# Patient Record
Sex: FEMALE | Race: WHITE | Employment: FULL TIME | URBAN - METROPOLITAN AREA
[De-identification: names, ages, dates, MRNs, and addresses within clinical notes are randomized per-mention and may not be internally consistent; named-entity substitution may affect disease eponyms.]

---

## 2018-02-12 ENCOUNTER — HOSPITAL ENCOUNTER (OUTPATIENT)
Dept: ULTRASOUND IMAGING | Age: 43
Discharge: HOME | End: 2018-02-12
Attending: FAMILY MEDICINE
Payer: COMMERCIAL

## 2018-02-12 DIAGNOSIS — R10.11 RUQ PAIN: ICD-10-CM

## 2018-02-12 PROCEDURE — 76705 ECHO EXAM OF ABDOMEN: CPT

## 2018-02-26 ENCOUNTER — HOSPITAL ENCOUNTER (OUTPATIENT)
Dept: NUCLEAR MEDICINE | Age: 43
Discharge: HOME | End: 2018-02-26
Attending: SPECIALIST
Payer: COMMERCIAL

## 2018-02-26 VITALS — WEIGHT: 155 LBS

## 2018-02-26 DIAGNOSIS — R10.11 RUQ PAIN: ICD-10-CM

## 2018-02-26 DIAGNOSIS — R10.13 EPIGASTRIC PAIN: ICD-10-CM

## 2018-02-26 DIAGNOSIS — R14.0 BLOATING: ICD-10-CM

## 2018-02-26 PROCEDURE — A9537 TC99M MEBROFENIN: HCPCS

## 2018-02-26 PROCEDURE — 78227 HEPATOBIL SYST IMAGE W/DRUG: CPT

## 2018-02-26 PROCEDURE — 74011250636 HC RX REV CODE- 250/636

## 2018-02-26 RX ADMIN — SINCALIDE 1.41 MCG: 5 INJECTION, POWDER, LYOPHILIZED, FOR SOLUTION INTRAVENOUS at 14:18

## 2020-09-18 ENCOUNTER — OFFICE VISIT (OUTPATIENT)
Dept: PRIMARY CARE CLINIC | Age: 45
End: 2020-09-18
Payer: COMMERCIAL

## 2020-09-18 VITALS
HEART RATE: 79 BPM | SYSTOLIC BLOOD PRESSURE: 118 MMHG | RESPIRATION RATE: 16 BRPM | WEIGHT: 165 LBS | BODY MASS INDEX: 25.9 KG/M2 | TEMPERATURE: 98.3 F | OXYGEN SATURATION: 99 % | DIASTOLIC BLOOD PRESSURE: 77 MMHG | HEIGHT: 67 IN

## 2020-09-18 DIAGNOSIS — K30 FUNCTIONAL DYSPEPSIA: Primary | ICD-10-CM

## 2020-09-18 PROCEDURE — 99202 OFFICE O/P NEW SF 15 MIN: CPT | Performed by: NURSE PRACTITIONER

## 2020-09-18 RX ORDER — PANTOPRAZOLE SODIUM 20 MG/1
20 TABLET, DELAYED RELEASE ORAL DAILY
Qty: 21 TAB | Refills: 0 | Status: SHIPPED | OUTPATIENT
Start: 2020-09-18 | End: 2020-09-23

## 2020-09-18 NOTE — PATIENT INSTRUCTIONS
Indigestion (Dyspepsia or Heartburn): Care Instructions Your Care Instructions Sometimes it can be hard to pinpoint the cause of indigestion. (It is also called dyspepsia or heartburn.) Most cases of an upset stomach with bloating, burning, burping, and nausea are minor and go away within several hours. Home treatment and over-the-counter medicine often are able to control symptoms. But if you take medicine to relieve your indigestion without making diet and lifestyle changes, your symptoms are likely to return again and again. If you get indigestion often, it may be a sign of a more serious medical problem. Be sure to follow up with your doctor, who may want to do tests to be sure of the cause of your indigestion. Follow-up care is a key part of your treatment and safety. Be sure to make and go to all appointments, and call your doctor if you are having problems. It's also a good idea to know your test results and keep a list of the medicines you take. How can you care for yourself at home? · Your doctor may recommend over-the-counter medicine. For mild or occasional indigestion, antacids such as Gaviscon, Mylanta, Maalox, or Tums, may help. Be safe with medicines. Be careful when you take over-the-counter antacid medicines. Many of these medicines have aspirin in them. Read the label to make sure that you are not taking more than the recommended dose. Too much aspirin can be harmful. · Your doctor also may recommend over-the-counter acid reducers, such as Pepcid AC (famotidine), Tagamet HB (cimetidine), or Prilosec (omeprazole). Read and follow all instructions on the label. If you use these medicines often, talk with your doctor. · Change your eating habits. ? It's best to eat several small meals instead of two or three large meals. ? After you eat, wait 2 to 3 hours before you lie down. ? Chocolate, mint, and alcohol can make GERD worse. ? Spicy foods, foods that have a lot of acid (like tomatoes and oranges), and coffee can make GERD symptoms worse in some people. If your symptoms are worse after you eat a certain food, you may want to stop eating that food to see if your symptoms get better. · Do not smoke or chew tobacco. Smoking can make GERD worse. If you need help quitting, talk to your doctor about stop-smoking programs and medicines. These can increase your chances of quitting for good. · If you have GERD symptoms at night, raise the head of your bed 6 to 8 inches. You can do this by putting the frame on blocks or placing a foam wedge under the head of your mattress. (Adding extra pillows does not work.) · Do not wear tight clothing around your middle. · Lose weight if you need to. Losing just 5 to 10 pounds can help. · Do not take anti-inflammatory medicines, such as aspirin, ibuprofen (Advil, Motrin), or naproxen (Aleve). These can irritate the stomach. If you need a pain medicine, try acetaminophen (Tylenol), which does not cause stomach upset. When should you call for help? Call your doctor now or seek immediate medical care if: 
  · You have new or worse belly pain.  
  · You are vomiting. Watch closely for changes in your health, and be sure to contact your doctor if: 
  · You have new or worse symptoms of indigestion.  
  · You have trouble or pain swallowing.  
  · You are losing weight.  
  · You do not get better as expected. Where can you learn more? Go to http://brandy-mai.info/ Enter I602 in the search box to learn more about \"Indigestion (Dyspepsia or Heartburn): Care Instructions. \" Current as of: April 15, 2020               Content Version: 12.6 © 2977-3624 [employer]. Care instructions adapted under license by [employer] (which disclaims liability or warranty for this information).  If you have questions about a medical condition or this instruction, always ask your healthcare professional. [place] [address] any warranty or liability for your use of this information.

## 2020-09-18 NOTE — PROGRESS NOTES
RM 4    Chief Complaint   Patient presents with    Abdominal Pain     pt gets bloated and nauseated after eating off and on, x 2 weeks along with some pain       Visit Vitals  /77 (BP 1 Location: Right arm, BP Patient Position: Sitting)   Pulse 79   Temp 98.3 °F (36.8 °C) (Skin)   Resp 16   Ht 5' 6.5\" (1.689 m)   Wt 165 lb (74.8 kg)   SpO2 99%   BMI 26.23 kg/m²

## 2020-09-18 NOTE — PROGRESS NOTES
HISTORY OF PRESENT ILLNESS  Carly Vizcarra is a 39 y.o. female. Patient presents with a  2 weeks history of bloating and nausea off and on. She reports epigastric discomfort after each meal regardless of the size. Her stomach feels bloated and she feels full immediately. She reports having a clean diet, minimal caffeine, no NSAID use. She denies constipation or diarrhea, no blood or mucus in stool, no unintentional weight loss. She has a mild intolerance to lactose. No other food allergies. Did take probiotics sporatically, which seemed to help. She   has seen  Dr. Emeli Cooper  in the past. Had endoscopy 2018 with a diagnosis of gastritis. Abdominal Pain   The history is provided by the patient. This is a new problem. The current episode started more than 1 week ago. The problem occurs daily. The problem has not changed since onset. Associated symptoms include abdominal pain. The symptoms are aggravated by eating. No past medical history on file. No past surgical history on file. Not on File      Review of Systems   Constitutional: Negative for chills, malaise/fatigue and weight loss. Cardiovascular: Negative for leg swelling. Gastrointestinal: Positive for abdominal pain. Genitourinary: Negative for dysuria. Musculoskeletal: Negative for myalgias. Endo/Heme/Allergies: Negative for environmental allergies. Psychiatric/Behavioral: Negative for depression. Physical Exam  Vitals signs and nursing note reviewed. Constitutional:       General: She is not in acute distress. Appearance: Normal appearance. She is normal weight. HENT:      Head: Normocephalic and atraumatic. Eyes:      Pupils: Pupils are equal, round, and reactive to light. Cardiovascular:      Rate and Rhythm: Normal rate. Pulses: Normal pulses. Pulmonary:      Effort: Pulmonary effort is normal.   Abdominal:      General: Bowel sounds are normal. There is no distension. Palpations: Abdomen is soft. There is no hepatomegaly or splenomegaly. Tenderness: There is no abdominal tenderness. Skin:     General: Skin is warm. Neurological:      General: No focal deficit present. Mental Status: She is alert. Psychiatric:         Mood and Affect: Mood normal.         ASSESSMENT and PLAN    ICD-10-CM ICD-9-CM    1. Functional dyspepsia  K30 536.8      Encounter Diagnoses   Name Primary?  Functional dyspepsia Yes     Orders Placed This Encounter    pantoprazole (PROTONIX) 20 mg tablet   Trial PPI for 14 to 21 days. If no response after 7 days please call your GI doctor for an appointment. Monitor intake with a food diary to track symptoms. Continue taking probiotic. If possible avoid products with lactose. It was a pleasure to see you in the office today. Please call if you have any further questions or concerns. I am available through the portal system.      Signed By: BOUBACAR Cabrera     September 18, 2020

## 2020-09-23 ENCOUNTER — TELEPHONE (OUTPATIENT)
Dept: PRIMARY CARE CLINIC | Age: 45
End: 2020-09-23

## 2020-09-23 DIAGNOSIS — K29.00 ACUTE GASTRITIS WITHOUT HEMORRHAGE, UNSPECIFIED GASTRITIS TYPE: Primary | ICD-10-CM

## 2020-09-23 RX ORDER — FAMOTIDINE 40 MG/1
40 TABLET, FILM COATED ORAL DAILY
Qty: 30 TAB | Refills: 0 | Status: SHIPPED | OUTPATIENT
Start: 2020-09-23 | End: 2020-10-23

## 2020-09-23 NOTE — TELEPHONE ENCOUNTER
Pt was seen on 9/18/2020 and was prescribed Pro tonics and it worked the first few days not not working. Pt has appt with GI MD on 10/7/2020.  Pt would like a call back

## 2020-09-23 NOTE — TELEPHONE ENCOUNTER
Patient called. States medication was working for several days. Symptoms returned days later with bloating and epigastric pain immediately after eating. Also states she has been fatigued lately. Has been monitoring food intake. Raheelies N/V/D/C. Does have a virtual visit with GI on October 7th. Instructed patient to stop protonix. Will trial pepcid. Continue to monitor ans document symptoms for GI visit.

## 2020-12-17 ENCOUNTER — OFFICE VISIT (OUTPATIENT)
Dept: URGENT CARE | Age: 45
End: 2020-12-17

## 2020-12-17 VITALS — HEART RATE: 77 BPM | TEMPERATURE: 98.9 F | RESPIRATION RATE: 18 BRPM | OXYGEN SATURATION: 98 %

## 2020-12-17 DIAGNOSIS — Z20.822 EXPOSURE TO COVID-19 VIRUS: Primary | ICD-10-CM

## 2020-12-17 PROCEDURE — 99202 OFFICE O/P NEW SF 15 MIN: CPT | Performed by: FAMILY MEDICINE

## 2020-12-17 NOTE — LETTER
December 17, 2020 Samantha Acevedo [address] [facility] [address] Dear Dilcia: Thank you for requesting access to [facility]. Please follow the instructions below to securely access and download your online medical record. [facility] allows you to send messages to your doctor, view your test results, renew your prescriptions, schedule appointments, and more. How Do I Sign Up? 1. In your internet browser, go to [email or web address]. 2. Click on the First Time User? Click Here link in the Sign In box. You will see the New Member Sign Up page. 3. Enter your [facility] Access Code exactly as it appears below. You will not need to use this code after youve completed the sign-up process. If you do not sign up before the expiration date, you must request a new code. [facility] Access Code: [email or web address] Expires: 1/31/2021  9:24 AM  
 
4. Enter the last four digits of your Social Security Number (xxxx) and Date of Birth (mm/dd/yyyy) as indicated and click Submit. You will be taken to the next sign-up page. 5. Create a [facility] ID. This will be your [facility] login ID and cannot be changed, so think of one that is secure and easy to remember. 6. Create a [facility] password. You can change your password at any time. 7. Enter your Password Reset Question and Answer. This can be used at a later time if you forget your password. 8. Enter your e-mail address. You will receive e-mail notification when new information is available in [address]. 9. Click Sign Up. You can now view and download portions of your medical record. 10. Click the Download Summary menu link to download a portable copy of your medical information. Additional Information If you have questions, please visit the Frequently Asked Questions section of the [employer] website at [email or web address] Remember, [facility] is NOT to be used for urgent needs. For medical emergencies, dial 911. Now available from your iPhone and Android! Sincerely, [employer]

## 2020-12-17 NOTE — PROGRESS NOTES
This patient was seen at [address] [place] [address] [facility]gent Care while in their vehicle due to COVID-19 pandemic with PPE and focused examination in order to decrease community viral transmission. The patient/guardian gave verbal consent to treat. Dixie Freed is a 39 y.o. female who presents for COVID-19 testing. Was exposed to COVID-19 by co-worker, last contact 2 days ago. Denies cough, fever, SOB. The history is provided by the patient. History reviewed. No pertinent past medical history. History reviewed. No pertinent surgical history. History reviewed. No pertinent family history.      Social History     Socioeconomic History    Marital status:      Spouse name: Not on file    Number of children: Not on file    Years of education: Not on file    Highest education level: Not on file   Occupational History    Not on file   Social Needs    Financial resource strain: Not on file    Food insecurity     Worry: Not on file     Inability: Not on file    Transportation needs     Medical: Not on file     Non-medical: Not on file   Tobacco Use    Smoking status: Unknown If Ever Smoked   Substance and Sexual Activity    Alcohol use: Not on file    Drug use: Not on file    Sexual activity: Not on file   Lifestyle    Physical activity     Days per week: Not on file     Minutes per session: Not on file    Stress: Not on file   Relationships    Social connections     Talks on phone: Not on file     Gets together: Not on file     Attends Shinto service: Not on file     Active member of club or organization: Not on file     Attends meetings of clubs or organizations: Not on file     Relationship status: Not on file    Intimate partner violence     Fear of current or ex partner: Not on file     Emotionally abused: Not on file     Physically abused: Not on file     Forced sexual activity: Not on file   Other Topics Concern    Not on file   Social History Narrative    Not on file ALLERGIES: Patient has no known allergies. Review of Systems   Constitutional: Negative for activity change, appetite change, chills and fever. HENT: Negative for congestion, rhinorrhea and sore throat. Respiratory: Negative for cough, shortness of breath and wheezing. Cardiovascular: Negative for chest pain. Gastrointestinal: Negative for abdominal pain, diarrhea, nausea and vomiting. Musculoskeletal: Negative for myalgias. Neurological: Negative for headaches. Vitals:    12/17/20 0937   Pulse: 77   Resp: 18   Temp: 98.9 °F (37.2 °C)   SpO2: 98%       Physical Exam  Vitals signs and nursing note reviewed. Constitutional:       General: She is not in acute distress. Appearance: She is well-developed. She is not diaphoretic. Neurological:      Mental Status: She is alert. Psychiatric:         Behavior: Behavior normal.         Thought Content: Thought content normal.         Judgment: Judgment normal.         MDM    ICD-10-CM ICD-9-CM   1. Exposure to COVID-19 virus  Z20.828 V01.79       Orders Placed This Encounter    NOVEL CORONAVIRUS (COVID-19)     Scheduling Instructions:      1) Due to current limited availability of the COVID-19 PCR test, tests will be prioritized and may not be completed.              2) Order only if the test result will change clinical management or necessary for a return to mission-critical employment decision.              3) Print and instruct patient to adhere to CDC home isolation program. (Link Above)              4) Set up or refer patient for a monitoring program.              5) Have patient sign up for and leverage MyChart (if not previously done). Order Specific Question:   Is this test for diagnosis or screening? Answer:   Screening     Order Specific Question:   Symptomatic for COVID-19 as defined by CDC? Answer:   No     Order Specific Question:   Hospitalized for COVID-19?      Answer:   No     Order Specific Question:   Admitted to ICU for COVID-19? Answer:   No     Order Specific Question:   Employed in healthcare setting? Answer:   No     Order Specific Question:   Resident in a congregate (group) care setting? Answer:   No     Order Specific Question:   Pregnant? Answer:   No     Order Specific Question:   Previously tested for COVID-19? Answer:   No        Quarantine  Deep breathing exercises, ambulation      If signs and symptoms become worse the pt is to go to the ER.          Procedures

## 2020-12-19 LAB — SARS-COV-2, NAA: NOT DETECTED

## 2023-06-27 ENCOUNTER — HOSPITAL ENCOUNTER (OUTPATIENT)
Facility: HOSPITAL | Age: 48
Discharge: HOME | End: 2023-06-30
Attending: OBSTETRICS & GYNECOLOGY
Payer: COMMERCIAL

## 2023-06-27 DIAGNOSIS — N63.20 MASS OF LEFT BREAST, UNSPECIFIED QUADRANT: ICD-10-CM

## 2023-06-27 PROCEDURE — 76642 ULTRASOUND BREAST LIMITED: CPT

## 2023-06-27 PROCEDURE — 77066 DX MAMMO INCL CAD BI: CPT

## 2024-06-30 ENCOUNTER — HOSPITAL ENCOUNTER (EMERGENCY)
Facility: HOSPITAL | Age: 49
Discharge: HOME | End: 2024-06-30
Payer: OTHER MISCELLANEOUS

## 2024-06-30 ENCOUNTER — APPOINTMENT (OUTPATIENT)
Facility: HOSPITAL | Age: 49
End: 2024-06-30
Payer: OTHER MISCELLANEOUS

## 2024-06-30 VITALS
SYSTOLIC BLOOD PRESSURE: 140 MMHG | HEART RATE: 89 BPM | DIASTOLIC BLOOD PRESSURE: 83 MMHG | RESPIRATION RATE: 14 BRPM | TEMPERATURE: 98.6 F | WEIGHT: 171.52 LBS | OXYGEN SATURATION: 100 %
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
## 2024-06-30 DIAGNOSIS — S16.1XXA STRAIN OF NECK MUSCLE, INITIAL ENCOUNTER: ICD-10-CM

## 2024-06-30 DIAGNOSIS — M50.30 DDD (DEGENERATIVE DISC DISEASE), CERVICAL: ICD-10-CM

## 2024-06-30 PROCEDURE — 6370000000 HC RX 637 (ALT 250 FOR IP)

## 2024-06-30 PROCEDURE — 72125 CT NECK SPINE W/O DYE: CPT

## 2024-06-30 PROCEDURE — 99284 EMERGENCY DEPT VISIT MOD MDM: CPT

## 2024-06-30 RX ORDER — IBUPROFEN 600 MG/1
600 TABLET ORAL 4 TIMES DAILY PRN
Qty: 40 TABLET | Refills: 0 | Status: SHIPPED | OUTPATIENT
Start: 2024-06-30

## 2024-06-30 RX ORDER — IBUPROFEN 600 MG/1
600 TABLET ORAL
Status: COMPLETED | OUTPATIENT
Start: 2024-06-30 | End: 2024-06-30

## 2024-06-30 RX ORDER — CYCLOBENZAPRINE HCL 10 MG
10 TABLET ORAL 3 TIMES DAILY PRN
Qty: 15 TABLET | Refills: 0 | Status: SHIPPED | OUTPATIENT
Start: 2024-06-30 | End: 2024-07-10

## 2024-06-30 RX ORDER — ACETAMINOPHEN 500 MG
1000 TABLET ORAL
Status: COMPLETED | OUTPATIENT
Start: 2024-06-30 | End: 2024-06-30

## 2024-06-30 RX ADMIN — ACETAMINOPHEN 1000 MG: 500 TABLET ORAL at 12:04

## 2024-06-30 RX ADMIN — IBUPROFEN 600 MG: 600 TABLET, FILM COATED ORAL at 12:04

## 2024-06-30 ASSESSMENT — PAIN SCALES - GENERAL: PAINLEVEL_OUTOF10: 5

## 2024-06-30 NOTE — DISCHARGE INSTRUCTIONS
Thank you for choosing our Emergency Department for your care.  It is our privilege to care for you in your time of need.  In the next several days, you may receive a survey via email or mailed to your home about your experience with our team.  We would greatly appreciate you taking a few minutes to complete the survey, as we use this information to learn what we have done well and what we could be doing better. Thank you for trusting us with your care!    Below you will find a list of your tests from today's visit.   Labs  No results found for this or any previous visit (from the past 12 hour(s)).    Radiologic Studies  CT CERVICAL SPINE WO CONTRAST   Final Result   No acute fracture or subluxation of the cervical spine. There is   degenerative disc disease at C5-C6 and C6-C7.         Electronically signed by CHRISTINA ORELLANA        ------------------------------------------------------------------------------------------------------------  The evaluation and treatment you received in the Emergency Department were for an urgent problem. It is important that you follow-up with a doctor, nurse practitioner, or physician assistant to:  (1) confirm your diagnosis,  (2) re-evaluation of changes in your illness and treatment, and (3) for ongoing care. Please take your discharge instructions with you when you go to your follow-up appointment.     If you have any problem arranging a follow-up appointment, contact us!  If your symptoms become worse or you do not improve as expected, please return to us. We are available 24 hours a day.     If a prescription has been provided, please fill it as soon as possible to prevent a delay in treatment. If you have any questions or reservations about taking the medication due to side effects or interactions with other medications, please call your primary care provider or contact us directly.  Again, THANK YOU for choosing us to care for YOU!

## 2024-06-30 NOTE — ED PROVIDER NOTES
does not meet Critical Care Time, 0 minutes    ED IMPRESSION   No diagnosis found.      DISPOSITION/PLAN   DISPOSITION      Discharge Note: The patient is stable for discharge home. The signs, symptoms, diagnosis, and discharge instructions have been discussed, understanding conveyed, and agreed upon. The patient is to follow up as recommended or return to ER should their symptoms worsen.      PATIENT REFERRED TO:  No follow-up provider specified.      DISCHARGE MEDICATIONS:     Medication List      You have not been prescribed any medications.           DISCONTINUED MEDICATIONS:  There are no discharge medications for this patient.    I have seen and evaluated the patient autonomously. My supervision physician was on site and available for consultation if needed.     I am the Primary Clinician of Record: NADEEN Shields NP (electronically signed)    (Please note that parts of this dictation were completed with voice recognition software. Quite often unanticipated grammatical, syntax, homophones, and other interpretive errors are inadvertently transcribed by the computer software. Please disregards these errors. Please excuse any errors that have escaped final proofreading.)     Joleen Herron APRN - NP  06/30/24 2405

## 2024-07-03 ENCOUNTER — TRANSCRIBE ORDERS (OUTPATIENT)
Facility: HOSPITAL | Age: 49
End: 2024-07-03

## 2024-07-03 DIAGNOSIS — Z12.31 VISIT FOR SCREENING MAMMOGRAM: Primary | ICD-10-CM

## 2024-07-30 ENCOUNTER — TRANSCRIBE ORDERS (OUTPATIENT)
Facility: HOSPITAL | Age: 49
End: 2024-07-30

## 2024-07-30 ENCOUNTER — HOSPITAL ENCOUNTER (OUTPATIENT)
Facility: HOSPITAL | Age: 49
Discharge: HOME | End: 2024-08-02
Payer: COMMERCIAL

## 2024-07-30 DIAGNOSIS — Z12.31 OTHER SCREENING MAMMOGRAM: ICD-10-CM

## 2024-07-30 DIAGNOSIS — Z12.31 VISIT FOR SCREENING MAMMOGRAM: ICD-10-CM

## 2024-07-30 DIAGNOSIS — Z12.31 OTHER SCREENING MAMMOGRAM: Primary | ICD-10-CM

## 2024-07-30 PROCEDURE — 77063 BREAST TOMOSYNTHESIS BI: CPT
